# Patient Record
(demographics unavailable — no encounter records)

---

## 2025-01-16 NOTE — ASSESSMENT
[FreeTextEntry1] : Exam. Xrays obtained, 2 views WB of b/l feet, AP and Lat, reviewed and discussed with the patient.  Applied Elastoplast strapping with LA pad to the patient's b/l feet. Rx for diagnostic US of b/l feet. Instructed patient to wear supportive sneakers, and not walk barefoot. Instructed patient to rest, ice and elevate the affected areas prn. Obtained nail biopsy of right 3rd and 5th toes dystrophic toe nails, will review results with patient when available. Aseptic debridement of dystrophic toe nails with sterile nippers and electric erickson to patient's tolerance. Discussed with patient and educated patient on wound and fibrotic tissue present, and treatment. Aseptic preparation of the left plantar midfoot with betadine. Sharp surgical debridement of left foot wound fibrotic tissue with a sterile #10 blade, partial thickness, to healthy, bleeding granular tissue, patient tolerated treatment well. Applied Neosporin and a dry sterile bandage to the patient's left foot wound. Instructed patient to keep dressing clean, dry and intact for 24 hours. Instructed patient to apply antibiotic cream and a bandaid. Instructed patient to call our office if any signs or symptoms of infection arise. Patient demonstrated verbal understanding of all instructions. Patient to return to office in 1 week.

## 2025-01-16 NOTE — PHYSICAL EXAM
[General Appearance - Alert] : alert [General Appearance - In No Acute Distress] : in no acute distress [General Appearance - Well Nourished] : well nourished [de-identified] : Pain on palpation of plantar medial calcaneal tubercle b/l, and pain on palpation along central band of plantar fascia b/l, with mild edema b/l, with pain and edema L>R. Xrays- 2 views WB of b/l feet AP and Lat- no acute fractures noted b/l, no acute dislocations noted b/l, plantar and posterior calcaneal heel spurs noted b/l. [FreeTextEntry1] : SWMF 10/10 b/l, light touch intact WNL b/l, Achilles tendon reflex intact b/l.

## 2025-01-16 NOTE — PHYSICAL EXAM
[General Appearance - Alert] : alert [General Appearance - In No Acute Distress] : in no acute distress [General Appearance - Well Nourished] : well nourished [de-identified] : Pain on palpation of plantar medial calcaneal tubercle b/l, and pain on palpation along central band of plantar fascia b/l, with mild edema b/l, with pain and edema L>R. Xrays- 2 views WB of b/l feet AP and Lat- no acute fractures noted b/l, no acute dislocations noted b/l, plantar and posterior calcaneal heel spurs noted b/l. [FreeTextEntry1] : SWMF 10/10 b/l, light touch intact WNL b/l, Achilles tendon reflex intact b/l.

## 2025-01-16 NOTE — HISTORY OF PRESENT ILLNESS
[FreeTextEntry1] : 55 year old female patient presents for complaints of pain to both arches, L>R. She states that about a week ago she started to feel pain in the left foot's arch, and no Sunday when walking in her house she felt a sudden pain to the area and states the area has remained moderately painful since. Patient states the same scenario happened on her right foot 2 years ago, and lately she has mild pain to the right arch, at times. She states she has been trying to wear supportive shoes to minimize the pain. Patient also complains that 2 toes nails on her right foot look discolored and feel thick, and they have been this way for a while and they cause her pain. patient also complains of a wound to her right foot. She states about a week ago she felt like she has a "bump" or a blister to the area and later when she looked she saw a small wound with a minimal amount of blood. Patient states she is not sure if maybe her foot rubbed in her shoe, she states she does not recall stepping on anything. She states she has been applying topical antibiotic OTC cream and a bandaid and the area has minimal pain at times, with pressure. She denies drainage from the area. Patient denies nausea, vomiting, chills, fever.

## 2025-01-23 NOTE — ASSESSMENT
[FreeTextEntry1] : Exam. Reviewed and discussed with the patient the full results of b/l foot Ultrasound- no tears noted, chronic plantar fasciitis noted to right foot. Discussed patient's condition at length and that the areas are both much improved. Applied Elastoplast strapping with LA pad to the patient's b/l feet. Instructed patient to wear supportive sneakers, and not walk barefoot and to avoid high impact activity until further notice. Instructed the patient to rest, ice and elevate the affected areas prn. Instructed patient on stretches for plantar fascia and educated her on how and when to do them. Reviewed and discussed with the patient the results of nail biopsy of right 3rd and 5th toes dystrophic toe nails, onychomycosis. Discussed onychomycosis and treatment options at length with the patient. Patient states she does not want to treat the mycotic toe nails with any antifungal medicine at this time, and demonstrated verbal understanding of discussion of onychomycosis and treatment options. Discussed that left foot plantar wound site is well healed. Instructed the patient to call our office if any issue arise or pain increases. Patient demonstrated verbal understanding of all instructions. Patient to return to office in 2 weeks.

## 2025-01-23 NOTE — HISTORY OF PRESENT ILLNESS
[FreeTextEntry1] : 55 year old female patient returns to office for follow up care of pain to both arches. She states the left foot has felt much better for days, area feels "tired or achy" sometimes but she states it is minimal and short lasting. She states that the left foot skin issue to that arch has resolved well with no issues. Patient denies any drainage from the area that was the wound and she denies nausea, vomiting, chills, fever. She states the pain to right foot has decreased a lot, it is minimal, well controlled and not constant. She states she had the Ultrasounds done as instructed. Patient states she does not think she wants to treat her thick toe nails with any medication.

## 2025-01-23 NOTE — PHYSICAL EXAM
[General Appearance - Alert] : alert Navin Noyola(Resident) [General Appearance - In No Acute Distress] : in no acute distress [General Appearance - Well Nourished] : well nourished [de-identified] : No pain on palpation of plantar medial calcaneal tubercle b/l, and no pain on palpation along central band of plantar fascia left foot and mild discomfort to right foot, with mild edema b/l. [FreeTextEntry1] : SWMF 10/10 b/l, light touch intact WNL b/l, Achilles tendon reflex intact b/l.

## 2025-03-05 NOTE — CARDIOLOGY SUMMARY
[de-identified] : Sinus Rhythm  -Left atrial enlargement. poor r wave progression -T-abnormality -Anteroseptal ischemia.  [de-identified] : 2020  PAT [de-identified] : 3/2024 TMET 7.1 METS   average exercise capacity The ECG is nondiagnostic for Ischemia due to failure to achieve 85% maximum predicted heart rate. [de-identified] : 5/2020 normal LV function  3/2024 LVEF 64% trace MR

## 2025-03-05 NOTE — HISTORY OF PRESENT ILLNESS
[FreeTextEntry1] : 55  year old woman with a history of hypertension, anxiety son with congenital heart disease.  She was last here in 5/2024 Since her last visit she had a recent syncopal episode at home when standing up too quickly. She didnt eat much that day, had alcohol and then later got the flu. She went ot  ED and had negative workup. I personally reviewed all of the hospital records available to me at this time, which included but are not limited to the discharge summary, labs and imaging reports.  Since then she did not have any near syncope or syncope afterwards.  She   denies any chest pain, PND, orthopnea, lower extremity edema. She denies any blurry vision, headaches or recent stroke like symptoms. She has mild KELLER when going up the stairs. Walks at least 2 miles per day without concerning symptoms. She is compliant with her medications.

## 2025-03-05 NOTE — REVIEW OF SYSTEMS
Health Maintenance Due   Topic Date Due   • Hepatitis B Vaccine (2 of 3 - 19+ 3-dose series) 08/03/2012   • COVID-19 Vaccine (3 - Moderna series) 08/12/2021   • Depression Screening  09/21/2023       Patient is due for topics as listed above but is not proceeding with Immunization(s) COVID-19 and Hep B at this time.      [Negative] : Heme/Lymph

## 2025-03-05 NOTE — PHYSICAL EXAM
[Well Groomed] : well groomed [General Appearance - In No Acute Distress] : no acute distress [Eyelids - No Xanthelasma] : the eyelids demonstrated no xanthelasmas [Normal Oral Mucosa] : normal oral mucosa [No Oral Pallor] : no oral pallor [No Oral Cyanosis] : no oral cyanosis [Normal Jugular Venous A Waves Present] : normal jugular venous A waves present [Normal Jugular Venous V Waves Present] : normal jugular venous V waves present [No Jugular Venous Mac A Waves] : no jugular venous mac A waves [Respiration, Rhythm And Depth] : normal respiratory rhythm and effort [Exaggerated Use Of Accessory Muscles For Inspiration] : no accessory muscle use [Auscultation Breath Sounds / Voice Sounds] : lungs were clear to auscultation bilaterally [Abdomen Soft] : soft [Abdomen Tenderness] : non-tender [Abdomen Mass (___ Cm)] : no abdominal mass palpated [Abnormal Walk] : normal gait [Gait - Sufficient For Exercise Testing] : the gait was sufficient for exercise testing [Nail Clubbing] : no clubbing of the fingernails [Cyanosis, Localized] : no localized cyanosis [Petechial Hemorrhages (___cm)] : no petechial hemorrhages [Skin Color & Pigmentation] : normal skin color and pigmentation [] : no rash [No Venous Stasis] : no venous stasis [Skin Lesions] : no skin lesions [No Skin Ulcers] : no skin ulcer [No Xanthoma] : no  xanthoma was observed [Oriented To Time, Place, And Person] : oriented to person, place, and time [Affect] : the affect was normal [Mood] : the mood was normal [No Anxiety] : not feeling anxious [Normal Rate] : normal [I] : a grade 1 [2+] : left 2+ [Bruit] : no bruit heard [Well Developed] : well developed [Well Nourished] : well nourished [No Acute Distress] : no acute distress [Normal Conjunctiva] : normal conjunctiva [Normal Venous Pressure] : normal venous pressure [No Carotid Bruit] : no carotid bruit [Normal S1, S2] : normal S1, S2 [No Rub] : no rub [No Gallop] : no gallop [Rhythm Regular] : regular [Normal S1] : normal S1 [Normal S2] : normal S2 [No Murmur] : no murmurs heard [No Pitting Edema] : no pitting edema present [Right Carotid Bruit] : no bruit heard over the right carotid [Left Carotid Bruit] : no bruit heard over the left carotid [No Abnormalities] : the abdominal aorta was not enlarged and no bruit was heard [Clear Lung Fields] : clear lung fields [Good Air Entry] : good air entry [No Respiratory Distress] : no respiratory distress  [Soft] : abdomen soft [Non Tender] : non-tender [No Masses/organomegaly] : no masses/organomegaly [Normal Bowel Sounds] : normal bowel sounds [Normal Gait] : normal gait [No Edema] : no edema [No Cyanosis] : no cyanosis [No Clubbing] : no clubbing [No Varicosities] : no varicosities [No Rash] : no rash [No Skin Lesions] : no skin lesions [Moves all extremities] : moves all extremities [No Focal Deficits] : no focal deficits [Normal Speech] : normal speech [Alert and Oriented] : alert and oriented [Normal memory] : normal memory

## 2025-03-05 NOTE — END OF VISIT
[FreeTextEntry3] : I saw and evaluated the patient and discussed the care with the NP provider above on 05/22/2024 . I agree with the findings and plan as documented in the note above. Doing better. bp controll. Exercise and diet counseling was performed in order to reduce her future cardiovascular risk.

## 2025-03-05 NOTE — DISCUSSION/SUMMARY
[FreeTextEntry1] : 56 year woman with a history as listed presents for a follow up cardiac evaluation.  Ludmila had a syncopal episode that sounded vagal in nature. She has more anterior TWI that previous. Her last TMET was nondiagnostic. She will get a 640 slice cardiac CT to define her coronary anatomy and to rule out significant CAD. She will get a 2d echo to assess for any  new structural heart disease, changes in valvular and ventricular function.  She will continue Losartan 50mg Qday.  Her palpitations have improved overall. She had an episode of PAT that was asymptomatic on her Holter.   She will continue Toprol 50mg daily Exercise and diet counseling was performed in order to reduce her future cardiovascular risk.  She will follow up with me in 6 months or sooner if necessary.   [EKG obtained to assist in diagnosis and management of assessed problem(s)] : EKG obtained to assist in diagnosis and management of assessed problem(s)